# Patient Record
Sex: MALE | Race: WHITE | NOT HISPANIC OR LATINO | URBAN - METROPOLITAN AREA
[De-identification: names, ages, dates, MRNs, and addresses within clinical notes are randomized per-mention and may not be internally consistent; named-entity substitution may affect disease eponyms.]

---

## 2017-04-04 ENCOUNTER — OUTPATIENT (OUTPATIENT)
Dept: OUTPATIENT SERVICES | Facility: HOSPITAL | Age: 62
LOS: 1 days | Discharge: HOME | End: 2017-04-04

## 2017-06-27 DIAGNOSIS — K64.8 OTHER HEMORRHOIDS: ICD-10-CM

## 2017-06-27 DIAGNOSIS — I10 ESSENTIAL (PRIMARY) HYPERTENSION: ICD-10-CM

## 2017-06-27 DIAGNOSIS — Z12.11 ENCOUNTER FOR SCREENING FOR MALIGNANT NEOPLASM OF COLON: ICD-10-CM

## 2017-06-27 DIAGNOSIS — K64.4 RESIDUAL HEMORRHOIDAL SKIN TAGS: ICD-10-CM

## 2017-06-27 DIAGNOSIS — E78.00 PURE HYPERCHOLESTEROLEMIA, UNSPECIFIED: ICD-10-CM

## 2017-06-27 DIAGNOSIS — D12.2 BENIGN NEOPLASM OF ASCENDING COLON: ICD-10-CM

## 2017-06-27 DIAGNOSIS — R15.0 INCOMPLETE DEFECATION: ICD-10-CM

## 2017-06-27 DIAGNOSIS — F31.9 BIPOLAR DISORDER, UNSPECIFIED: ICD-10-CM

## 2018-07-12 PROBLEM — Z00.00 ENCOUNTER FOR PREVENTIVE HEALTH EXAMINATION: Status: ACTIVE | Noted: 2018-07-12

## 2018-07-16 ENCOUNTER — APPOINTMENT (OUTPATIENT)
Dept: SURGERY | Facility: CLINIC | Age: 63
End: 2018-07-16
Payer: MEDICARE

## 2018-07-16 VITALS
SYSTOLIC BLOOD PRESSURE: 138 MMHG | HEIGHT: 72 IN | BODY MASS INDEX: 32.91 KG/M2 | WEIGHT: 243 LBS | DIASTOLIC BLOOD PRESSURE: 82 MMHG

## 2018-07-16 DIAGNOSIS — Z82.49 FAMILY HISTORY OF ISCHEMIC HEART DISEASE AND OTHER DISEASES OF THE CIRCULATORY SYSTEM: ICD-10-CM

## 2018-07-16 DIAGNOSIS — K61.0 ANAL ABSCESS: ICD-10-CM

## 2018-07-16 DIAGNOSIS — F40.8 OTHER PHOBIC ANXIETY DISORDERS: ICD-10-CM

## 2018-07-16 DIAGNOSIS — F31.9 BIPOLAR DISORDER, UNSPECIFIED: ICD-10-CM

## 2018-07-16 DIAGNOSIS — F32.9 MAJOR DEPRESSIVE DISORDER, SINGLE EPISODE, UNSPECIFIED: ICD-10-CM

## 2018-07-16 DIAGNOSIS — L02.32 FURUNCLE OF BUTTOCK: ICD-10-CM

## 2018-07-16 DIAGNOSIS — I10 ESSENTIAL (PRIMARY) HYPERTENSION: ICD-10-CM

## 2018-07-16 PROCEDURE — 99202 OFFICE O/P NEW SF 15 MIN: CPT

## 2018-07-16 RX ORDER — METOPROLOL TARTRATE 50 MG/1
50 TABLET ORAL
Refills: 0 | Status: ACTIVE | COMMUNITY

## 2018-07-16 RX ORDER — ASPIRIN ENTERIC COATED TABLETS 81 MG 81 MG/1
81 TABLET, DELAYED RELEASE ORAL
Refills: 0 | Status: ACTIVE | COMMUNITY

## 2018-07-16 RX ORDER — DIVALPROEX SODIUM 500 MG/1
500 TABLET, DELAYED RELEASE ORAL
Refills: 0 | Status: ACTIVE | COMMUNITY

## 2018-07-16 RX ORDER — OXYBUTYNIN CHLORIDE 15 MG/1
15 TABLET, EXTENDED RELEASE ORAL
Refills: 0 | Status: ACTIVE | COMMUNITY

## 2018-07-16 RX ORDER — FLUOXETINE HYDROCHLORIDE 20 MG/1
20 CAPSULE ORAL
Refills: 0 | Status: ACTIVE | COMMUNITY

## 2018-07-16 RX ORDER — RAMIPRIL 2.5 MG/1
2.5 CAPSULE ORAL
Refills: 0 | Status: ACTIVE | COMMUNITY

## 2018-07-16 RX ORDER — RISPERIDONE 4 MG/1
4 TABLET ORAL
Refills: 0 | Status: ACTIVE | COMMUNITY

## 2018-08-06 ENCOUNTER — APPOINTMENT (OUTPATIENT)
Dept: SURGERY | Facility: CLINIC | Age: 63
End: 2018-08-06

## 2019-06-13 ENCOUNTER — APPOINTMENT (OUTPATIENT)
Dept: CARDIOLOGY | Facility: CLINIC | Age: 64
End: 2019-06-13
Payer: MEDICARE

## 2019-06-13 PROCEDURE — 99214 OFFICE O/P EST MOD 30 MIN: CPT

## 2019-06-13 PROCEDURE — 93000 ELECTROCARDIOGRAM COMPLETE: CPT

## 2019-11-26 ENCOUNTER — APPOINTMENT (OUTPATIENT)
Dept: CARDIOLOGY | Facility: CLINIC | Age: 64
End: 2019-11-26
Payer: MEDICARE

## 2019-11-26 PROCEDURE — 93306 TTE W/DOPPLER COMPLETE: CPT

## 2019-12-17 ENCOUNTER — APPOINTMENT (OUTPATIENT)
Dept: CARDIOLOGY | Facility: CLINIC | Age: 64
End: 2019-12-17
Payer: MEDICARE

## 2019-12-17 PROCEDURE — 93000 ELECTROCARDIOGRAM COMPLETE: CPT

## 2019-12-17 PROCEDURE — 99214 OFFICE O/P EST MOD 30 MIN: CPT

## 2020-01-01 ENCOUNTER — RX RENEWAL (OUTPATIENT)
Age: 65
End: 2020-01-01

## 2020-01-01 ENCOUNTER — INPATIENT (INPATIENT)
Facility: HOSPITAL | Age: 65
LOS: 0 days | End: 2020-12-29
Attending: SURGERY | Admitting: SURGERY
Payer: MEDICARE

## 2020-01-01 ENCOUNTER — RESULT REVIEW (OUTPATIENT)
Age: 65
End: 2020-01-01

## 2020-01-01 ENCOUNTER — APPOINTMENT (OUTPATIENT)
Dept: CARDIOLOGY | Facility: CLINIC | Age: 65
End: 2020-01-01

## 2020-01-01 ENCOUNTER — APPOINTMENT (OUTPATIENT)
Dept: CARDIOLOGY | Facility: CLINIC | Age: 65
End: 2020-01-01
Payer: MEDICARE

## 2020-01-01 VITALS
OXYGEN SATURATION: 93 % | SYSTOLIC BLOOD PRESSURE: 210 MMHG | WEIGHT: 235.01 LBS | RESPIRATION RATE: 18 BRPM | HEART RATE: 61 BPM | DIASTOLIC BLOOD PRESSURE: 109 MMHG

## 2020-01-01 VITALS — RESPIRATION RATE: 20 BRPM

## 2020-01-01 DIAGNOSIS — K55.019 ACUTE (REVERSIBLE) ISCHEMIA OF SMALL INTESTINE, EXTENT UNSPECIFIED: ICD-10-CM

## 2020-01-01 DIAGNOSIS — E78.5 HYPERLIPIDEMIA, UNSPECIFIED: ICD-10-CM

## 2020-01-01 DIAGNOSIS — K55.049 ACUTE INFARCTION OF LARGE INTESTINE, EXTENT UNSPECIFIED: ICD-10-CM

## 2020-01-01 DIAGNOSIS — J18.9 PNEUMONIA, UNSPECIFIED ORGANISM: ICD-10-CM

## 2020-01-01 DIAGNOSIS — I48.91 UNSPECIFIED ATRIAL FIBRILLATION: ICD-10-CM
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
## 2020-01-01 DIAGNOSIS — N17.9 ACUTE KIDNEY FAILURE, UNSPECIFIED: ICD-10-CM

## 2020-01-01 DIAGNOSIS — I10 ESSENTIAL (PRIMARY) HYPERTENSION: ICD-10-CM

## 2020-01-01 DIAGNOSIS — J96.01 ACUTE RESPIRATORY FAILURE WITH HYPOXIA: ICD-10-CM

## 2020-01-01 DIAGNOSIS — R65.21 SEVERE SEPSIS WITH SEPTIC SHOCK: ICD-10-CM

## 2020-01-01 DIAGNOSIS — Z95.1 PRESENCE OF AORTOCORONARY BYPASS GRAFT: ICD-10-CM

## 2020-01-01 DIAGNOSIS — E87.5 HYPERKALEMIA: ICD-10-CM

## 2020-01-01 DIAGNOSIS — E87.2 ACIDOSIS: ICD-10-CM

## 2020-01-01 DIAGNOSIS — I97.121 POSTPROCEDURAL CARDIAC ARREST FOLLOWING OTHER SURGERY: ICD-10-CM

## 2020-01-01 DIAGNOSIS — K63.89 OTHER SPECIFIED DISEASES OF INTESTINE: ICD-10-CM

## 2020-01-01 DIAGNOSIS — Z66 DO NOT RESUSCITATE: ICD-10-CM

## 2020-01-01 DIAGNOSIS — A41.9 SEPSIS, UNSPECIFIED ORGANISM: ICD-10-CM
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
## 2020-01-01 DIAGNOSIS — K56.2 VOLVULUS: ICD-10-CM

## 2020-01-01 LAB
ALBUMIN SERPL ELPH-MCNC: 2.3 G/DL — LOW (ref 3.5–5.2)
ALBUMIN SERPL ELPH-MCNC: 3.5 G/DL — SIGNIFICANT CHANGE UP (ref 3.5–5.2)
ALP SERPL-CCNC: 55 U/L — SIGNIFICANT CHANGE UP (ref 30–115)
ALP SERPL-CCNC: 64 U/L — SIGNIFICANT CHANGE UP (ref 30–115)
ALT FLD-CCNC: 153 U/L — HIGH (ref 0–41)
ALT FLD-CCNC: 35 U/L — SIGNIFICANT CHANGE UP (ref 0–41)
ANION GAP SERPL CALC-SCNC: 27 MMOL/L — HIGH (ref 7–14)
ANION GAP SERPL CALC-SCNC: 32 MMOL/L — HIGH (ref 7–14)
APTT BLD: 57.4 SEC — HIGH (ref 27–39.2)
AST SERPL-CCNC: 106 U/L — HIGH (ref 0–41)
AST SERPL-CCNC: 249 U/L — HIGH (ref 0–41)
BASE EXCESS BLDA CALC-SCNC: -23.6 MMOL/L — LOW (ref -2–2)
BASE EXCESS BLDV CALC-SCNC: -18 MMOL/L — LOW (ref -2–2)
BASE EXCESS BLDV CALC-SCNC: -20.7 MMOL/L — LOW (ref -2–2)
BASOPHILS # BLD AUTO: 0 K/UL — SIGNIFICANT CHANGE UP (ref 0–0.2)
BASOPHILS NFR BLD AUTO: 0 % — SIGNIFICANT CHANGE UP (ref 0–1)
BILIRUB SERPL-MCNC: 0.5 MG/DL — SIGNIFICANT CHANGE UP (ref 0.2–1.2)
BILIRUB SERPL-MCNC: 0.6 MG/DL — SIGNIFICANT CHANGE UP (ref 0.2–1.2)
BLD GP AB SCN SERPL QL: SIGNIFICANT CHANGE UP
BUN SERPL-MCNC: 87 MG/DL — CRITICAL HIGH (ref 10–20)
BUN SERPL-MCNC: 88 MG/DL — CRITICAL HIGH (ref 10–20)
BURR CELLS BLD QL SMEAR: PRESENT — SIGNIFICANT CHANGE UP
CA-I SERPL-SCNC: 1 MMOL/L — LOW (ref 1.12–1.3)
CA-I SERPL-SCNC: 1.01 MMOL/L — LOW (ref 1.12–1.3)
CALCIUM SERPL-MCNC: 7.3 MG/DL — LOW (ref 8.5–10.1)
CALCIUM SERPL-MCNC: 8.2 MG/DL — LOW (ref 8.5–10.1)
CHLORIDE SERPL-SCNC: 91 MMOL/L — LOW (ref 98–110)
CHLORIDE SERPL-SCNC: 96 MMOL/L — LOW (ref 98–110)
CO2 BLDV-SCNC: 53 — SIGNIFICANT CHANGE UP
CO2 SERPL-SCNC: 11 MMOL/L — LOW (ref 17–32)
CO2 SERPL-SCNC: 14 MMOL/L — LOW (ref 17–32)
CREAT SERPL-MCNC: 3.9 MG/DL — HIGH (ref 0.7–1.5)
CREAT SERPL-MCNC: 4.3 MG/DL — CRITICAL HIGH (ref 0.7–1.5)
CRP SERPL-MCNC: 62.42 MG/DL — HIGH (ref 0–0.4)
D DIMER BLD IA.RAPID-MCNC: 2003 NG/ML DDU — HIGH (ref 0–230)
EOSINOPHIL # BLD AUTO: 0 K/UL — SIGNIFICANT CHANGE UP (ref 0–0.7)
EOSINOPHIL NFR BLD AUTO: 0 % — SIGNIFICANT CHANGE UP (ref 0–8)
FERRITIN SERPL-MCNC: 1008 NG/ML — HIGH (ref 30–400)
GAS PNL BLDA: SIGNIFICANT CHANGE UP
GAS PNL BLDA: SIGNIFICANT CHANGE UP
GAS PNL BLDV: 133 MMOL/L — LOW (ref 136–145)
GAS PNL BLDV: 136 MMOL/L — SIGNIFICANT CHANGE UP (ref 136–145)
GAS PNL BLDV: SIGNIFICANT CHANGE UP
GAS PNL BLDV: SIGNIFICANT CHANGE UP
GIANT PLATELETS BLD QL SMEAR: PRESENT — SIGNIFICANT CHANGE UP
GLUCOSE SERPL-MCNC: 135 MG/DL — HIGH (ref 70–99)
GLUCOSE SERPL-MCNC: 154 MG/DL — HIGH (ref 70–99)
HCO3 BLDA-SCNC: 13 MMOL/L — LOW (ref 23–27)
HCO3 BLDV-SCNC: 12 MMOL/L — LOW (ref 22–29)
HCO3 BLDV-SCNC: 13 MMOL/L — LOW (ref 22–29)
HCT VFR BLD CALC: 41.6 % — LOW (ref 42–52)
HCT VFR BLD CALC: 49.5 % — SIGNIFICANT CHANGE UP (ref 42–52)
HCT VFR BLDA CALC: 51.3 % — HIGH (ref 34–44)
HCT VFR BLDA CALC: 62.3 % — HIGH (ref 34–44)
HGB BLD CALC-MCNC: 16.7 G/DL — SIGNIFICANT CHANGE UP (ref 14–18)
HGB BLD CALC-MCNC: 20.3 G/DL — CRITICAL HIGH (ref 14–18)
HGB BLD-MCNC: 12.8 G/DL — LOW (ref 14–18)
HGB BLD-MCNC: 16.4 G/DL — SIGNIFICANT CHANGE UP (ref 14–18)
INR BLD: 1.55 RATIO — HIGH (ref 0.65–1.3)
LACTATE BLDV-MCNC: 11.2 MMOL/L — HIGH (ref 0.5–1.6)
LACTATE BLDV-MCNC: 11.3 MMOL/L — HIGH (ref 0.5–1.6)
LDH SERPL L TO P-CCNC: 597 U/L — HIGH (ref 50–242)
LIDOCAIN IGE QN: 20 U/L — SIGNIFICANT CHANGE UP (ref 7–60)
LYMPHOCYTES # BLD AUTO: 19.1 % — LOW (ref 20.5–51.1)
LYMPHOCYTES # BLD AUTO: 2.38 K/UL — SIGNIFICANT CHANGE UP (ref 1.2–3.4)
MACROCYTES BLD QL: SLIGHT — SIGNIFICANT CHANGE UP
MANUAL SMEAR VERIFICATION: SIGNIFICANT CHANGE UP
MCHC RBC-ENTMCNC: 30.1 PG — SIGNIFICANT CHANGE UP (ref 27–31)
MCHC RBC-ENTMCNC: 30.3 PG — SIGNIFICANT CHANGE UP (ref 27–31)
MCHC RBC-ENTMCNC: 30.8 G/DL — LOW (ref 32–37)
MCHC RBC-ENTMCNC: 33.1 G/DL — SIGNIFICANT CHANGE UP (ref 32–37)
MCV RBC AUTO: 91.5 FL — SIGNIFICANT CHANGE UP (ref 80–94)
MCV RBC AUTO: 97.9 FL — HIGH (ref 80–94)
METAMYELOCYTES # FLD: 3.5 % — HIGH (ref 0–0)
MONOCYTES # BLD AUTO: 4.12 K/UL — HIGH (ref 0.1–0.6)
MONOCYTES NFR BLD AUTO: 33 % — HIGH (ref 1.7–9.3)
NEUTROPHILS # BLD AUTO: 4.99 K/UL — SIGNIFICANT CHANGE UP (ref 1.4–6.5)
NEUTROPHILS NFR BLD AUTO: 31.3 % — LOW (ref 42.2–75.2)
NEUTS BAND # BLD: 8.7 % — HIGH (ref 0–6)
NRBC # BLD: 1 /100 — HIGH (ref 0–0)
NRBC # BLD: 2 /100 WBCS — HIGH (ref 0–0)
NRBC # BLD: SIGNIFICANT CHANGE UP /100 WBCS (ref 0–0)
NT-PROBNP SERPL-SCNC: HIGH PG/ML (ref 0–300)
PCO2 BLDA: 86 MMHG — CRITICAL HIGH (ref 38–42)
PCO2 BLDV: 53 MMHG — HIGH (ref 41–51)
PCO2 BLDV: 57 MMHG — HIGH (ref 41–51)
PH BLDA: 6.78 — CRITICAL LOW (ref 7.38–7.42)
PH BLDV: 6.95 — LOW (ref 7.26–7.43)
PH BLDV: 7.01 — LOW (ref 7.26–7.43)
PLAT MORPH BLD: NORMAL — SIGNIFICANT CHANGE UP
PLATELET # BLD AUTO: 139 K/UL — SIGNIFICANT CHANGE UP (ref 130–400)
PLATELET # BLD AUTO: 172 K/UL — SIGNIFICANT CHANGE UP (ref 130–400)
PO2 BLDA: 56 MMHG — LOW (ref 78–95)
PO2 BLDV: 36 MMHG — SIGNIFICANT CHANGE UP (ref 20–40)
PO2 BLDV: 40 MMHG — SIGNIFICANT CHANGE UP (ref 20–40)
POIKILOCYTOSIS BLD QL AUTO: SIGNIFICANT CHANGE UP
POLYCHROMASIA BLD QL SMEAR: SLIGHT — SIGNIFICANT CHANGE UP
POTASSIUM BLDV-SCNC: 4.2 MMOL/L — SIGNIFICANT CHANGE UP (ref 3.3–5.6)
POTASSIUM BLDV-SCNC: 4.8 MMOL/L — SIGNIFICANT CHANGE UP (ref 3.3–5.6)
POTASSIUM SERPL-MCNC: 4.2 MMOL/L — SIGNIFICANT CHANGE UP (ref 3.5–5)
POTASSIUM SERPL-MCNC: 6.4 MMOL/L — CRITICAL HIGH (ref 3.5–5)
POTASSIUM SERPL-SCNC: 4.2 MMOL/L — SIGNIFICANT CHANGE UP (ref 3.5–5)
POTASSIUM SERPL-SCNC: 6.4 MMOL/L — CRITICAL HIGH (ref 3.5–5)
PROCALCITONIN SERPL-MCNC: >100 NG/ML — HIGH (ref 0.02–0.1)
PROT SERPL-MCNC: 3.9 G/DL — LOW (ref 6–8)
PROT SERPL-MCNC: 5.7 G/DL — LOW (ref 6–8)
PROTHROM AB SERPL-ACNC: 17.8 SEC — HIGH (ref 9.95–12.87)
RAPID RVP RESULT: SIGNIFICANT CHANGE UP
RBC # BLD: 4.25 M/UL — LOW (ref 4.7–6.1)
RBC # BLD: 5.41 M/UL — SIGNIFICANT CHANGE UP (ref 4.7–6.1)
RBC # FLD: 14.7 % — HIGH (ref 11.5–14.5)
RBC # FLD: 14.8 % — HIGH (ref 11.5–14.5)
RBC BLD AUTO: ABNORMAL
SAO2 % BLDA: 64 % — CRITICAL LOW (ref 94–98)
SAO2 % BLDV: 47 % — SIGNIFICANT CHANGE UP
SAO2 % BLDV: 51 % — SIGNIFICANT CHANGE UP
SARS-COV-2 RNA SPEC QL NAA+PROBE: SIGNIFICANT CHANGE UP
SMUDGE CELLS # BLD: PRESENT — SIGNIFICANT CHANGE UP
SODIUM SERPL-SCNC: 134 MMOL/L — LOW (ref 135–146)
SODIUM SERPL-SCNC: 137 MMOL/L — SIGNIFICANT CHANGE UP (ref 135–146)
TROPONIN T SERPL-MCNC: 1.31 NG/ML — CRITICAL HIGH
VARIANT LYMPHS # BLD: 4.4 % — SIGNIFICANT CHANGE UP (ref 0–5)
WBC # BLD: 12.48 K/UL — HIGH (ref 4.8–10.8)
WBC # BLD: 8.93 K/UL — SIGNIFICANT CHANGE UP (ref 4.8–10.8)
WBC # FLD AUTO: 12.48 K/UL — HIGH (ref 4.8–10.8)
WBC # FLD AUTO: 8.93 K/UL — SIGNIFICANT CHANGE UP (ref 4.8–10.8)

## 2020-01-01 PROCEDURE — 99292 CRITICAL CARE ADDL 30 MIN: CPT | Mod: 25

## 2020-01-01 PROCEDURE — 88307 TISSUE EXAM BY PATHOLOGIST: CPT | Mod: 26

## 2020-01-01 PROCEDURE — 71045 X-RAY EXAM CHEST 1 VIEW: CPT | Mod: 26,76

## 2020-01-01 PROCEDURE — 99223 1ST HOSP IP/OBS HIGH 75: CPT | Mod: 57

## 2020-01-01 PROCEDURE — 76937 US GUIDE VASCULAR ACCESS: CPT | Mod: 26

## 2020-01-01 PROCEDURE — 44143 PARTIAL REMOVAL OF COLON: CPT

## 2020-01-01 PROCEDURE — 74177 CT ABD & PELVIS W/CONTRAST: CPT | Mod: 26

## 2020-01-01 PROCEDURE — 31500 INSERT EMERGENCY AIRWAY: CPT

## 2020-01-01 PROCEDURE — 99441: CPT

## 2020-01-01 PROCEDURE — 36556 INSERT NON-TUNNEL CV CATH: CPT

## 2020-01-01 PROCEDURE — 71275 CT ANGIOGRAPHY CHEST: CPT | Mod: 26

## 2020-01-01 PROCEDURE — 97605 NEG PRS WND THER DME<=50SQCM: CPT

## 2020-01-01 PROCEDURE — 93010 ELECTROCARDIOGRAM REPORT: CPT

## 2020-01-01 PROCEDURE — 99291 CRITICAL CARE FIRST HOUR: CPT | Mod: 25

## 2020-01-01 PROCEDURE — 93880 EXTRACRANIAL BILAT STUDY: CPT

## 2020-01-01 RX ORDER — FLUOXETINE HCL 10 MG
0 CAPSULE ORAL
Qty: 0 | Refills: 0 | DISCHARGE

## 2020-01-01 RX ORDER — DIVALPROEX SODIUM 500 MG/1
1 TABLET, DELAYED RELEASE ORAL
Qty: 0 | Refills: 0 | DISCHARGE

## 2020-01-01 RX ORDER — ROSUVASTATIN CALCIUM 20 MG/1
20 TABLET, FILM COATED ORAL
Qty: 90 | Refills: 3 | Status: ACTIVE | COMMUNITY
Start: 2020-01-01 | End: 1900-01-01

## 2020-01-01 RX ORDER — METRONIDAZOLE 500 MG
500 TABLET ORAL ONCE
Refills: 0 | Status: COMPLETED | OUTPATIENT
Start: 2020-01-01 | End: 2020-01-01

## 2020-01-01 RX ORDER — PIPERACILLIN AND TAZOBACTAM 4; .5 G/20ML; G/20ML
3.38 INJECTION, POWDER, LYOPHILIZED, FOR SOLUTION INTRAVENOUS ONCE
Refills: 0 | Status: DISCONTINUED | OUTPATIENT
Start: 2020-01-01 | End: 2020-01-01

## 2020-01-01 RX ORDER — SODIUM CHLORIDE 9 MG/ML
3000 INJECTION, SOLUTION INTRAVENOUS ONCE
Refills: 0 | Status: COMPLETED | OUTPATIENT
Start: 2020-01-01 | End: 2020-01-01

## 2020-01-01 RX ORDER — PANTOPRAZOLE SODIUM 20 MG/1
8 TABLET, DELAYED RELEASE ORAL
Qty: 80 | Refills: 0 | Status: DISCONTINUED | OUTPATIENT
Start: 2020-01-01 | End: 2020-01-01

## 2020-01-01 RX ORDER — CHLORHEXIDINE GLUCONATE 213 G/1000ML
15 SOLUTION TOPICAL EVERY 12 HOURS
Refills: 0 | Status: DISCONTINUED | OUTPATIENT
Start: 2020-01-01 | End: 2020-01-01

## 2020-01-01 RX ORDER — CEFEPIME 1 G/1
2000 INJECTION, POWDER, FOR SOLUTION INTRAMUSCULAR; INTRAVENOUS ONCE
Refills: 0 | Status: COMPLETED | OUTPATIENT
Start: 2020-01-01 | End: 2020-01-01

## 2020-01-01 RX ORDER — CALCIUM CHLORIDE
1000 POWDER (GRAM) MISCELLANEOUS ONCE
Refills: 0 | Status: COMPLETED | OUTPATIENT
Start: 2020-01-01 | End: 2020-01-01

## 2020-01-01 RX ORDER — DILTIAZEM HCL 120 MG
25 CAPSULE, EXT RELEASE 24 HR ORAL
Qty: 125 | Refills: 0 | Status: DISCONTINUED | OUTPATIENT
Start: 2020-01-01 | End: 2020-01-01

## 2020-01-01 RX ORDER — PHENYLEPHRINE HYDROCHLORIDE 10 MG/ML
10 INJECTION INTRAVENOUS ONCE
Refills: 0 | Status: DISCONTINUED | OUTPATIENT
Start: 2020-01-01 | End: 2020-01-01

## 2020-01-01 RX ORDER — RAMIPRIL 10 MG/1
10 CAPSULE ORAL
Qty: 90 | Refills: 3 | Status: ACTIVE | COMMUNITY
Start: 2020-01-01 | End: 1900-01-01

## 2020-01-01 RX ORDER — GLUCAGON INJECTION, SOLUTION 0.5 MG/.1ML
1 INJECTION, SOLUTION SUBCUTANEOUS ONCE
Refills: 0 | Status: COMPLETED | OUTPATIENT
Start: 2020-01-01 | End: 2020-01-01

## 2020-01-01 RX ORDER — PANTOPRAZOLE SODIUM 20 MG/1
40 TABLET, DELAYED RELEASE ORAL ONCE
Refills: 0 | Status: COMPLETED | OUTPATIENT
Start: 2020-01-01 | End: 2020-01-01

## 2020-01-01 RX ORDER — SODIUM BICARBONATE 1 MEQ/ML
0.11 SYRINGE (ML) INTRAVENOUS
Qty: 150 | Refills: 0 | Status: DISCONTINUED | OUTPATIENT
Start: 2020-01-01 | End: 2020-01-01

## 2020-01-01 RX ORDER — RISPERIDONE 4 MG/1
0 TABLET ORAL
Qty: 0 | Refills: 0 | DISCHARGE

## 2020-01-01 RX ORDER — ROSUVASTATIN CALCIUM 20 MG/1
20 TABLET, FILM COATED ORAL DAILY
Qty: 90 | Refills: 3 | Status: ACTIVE | COMMUNITY
Start: 1900-01-01 | End: 1900-01-01

## 2020-01-01 RX ORDER — SODIUM CHLORIDE 9 MG/ML
2000 INJECTION, SOLUTION INTRAVENOUS ONCE
Refills: 0 | Status: COMPLETED | OUTPATIENT
Start: 2020-01-01 | End: 2020-01-01

## 2020-01-01 RX ORDER — DILTIAZEM HCL 120 MG
10 CAPSULE, EXT RELEASE 24 HR ORAL ONCE
Refills: 0 | Status: COMPLETED | OUTPATIENT
Start: 2020-01-01 | End: 2020-01-01

## 2020-01-01 RX ORDER — DEXTROSE 50 % IN WATER 50 %
50 SYRINGE (ML) INTRAVENOUS ONCE
Refills: 0 | Status: COMPLETED | OUTPATIENT
Start: 2020-01-01 | End: 2020-01-01

## 2020-01-01 RX ORDER — PHENYLEPHRINE HYDROCHLORIDE 10 MG/ML
0.3 INJECTION INTRAVENOUS
Qty: 160 | Refills: 0 | Status: DISCONTINUED | OUTPATIENT
Start: 2020-01-01 | End: 2020-01-01

## 2020-01-01 RX ORDER — SODIUM CHLORIDE 9 MG/ML
1000 INJECTION INTRAMUSCULAR; INTRAVENOUS; SUBCUTANEOUS ONCE
Refills: 0 | Status: COMPLETED | OUTPATIENT
Start: 2020-01-01 | End: 2020-01-01

## 2020-01-01 RX ORDER — INSULIN HUMAN 100 [IU]/ML
10 INJECTION, SOLUTION SUBCUTANEOUS ONCE
Refills: 0 | Status: COMPLETED | OUTPATIENT
Start: 2020-01-01 | End: 2020-01-01

## 2020-01-01 RX ORDER — NOREPINEPHRINE BITARTRATE/D5W 8 MG/250ML
0.05 PLASTIC BAG, INJECTION (ML) INTRAVENOUS
Qty: 8 | Refills: 0 | Status: DISCONTINUED | OUTPATIENT
Start: 2020-01-01 | End: 2020-01-01

## 2020-01-01 RX ORDER — CALCIUM CHLORIDE
1 POWDER (GRAM) MISCELLANEOUS ONCE
Refills: 0 | Status: DISCONTINUED | OUTPATIENT
Start: 2020-01-01 | End: 2020-01-01

## 2020-01-01 RX ORDER — METOPROLOL TARTRATE 25 MG/1
25 TABLET, FILM COATED ORAL
Qty: 180 | Refills: 3 | Status: ACTIVE | COMMUNITY
Start: 2020-01-01 | End: 1900-01-01

## 2020-01-01 RX ORDER — FUROSEMIDE 40 MG
20 TABLET ORAL ONCE
Refills: 0 | Status: COMPLETED | OUTPATIENT
Start: 2020-01-01 | End: 2020-01-01

## 2020-01-01 RX ORDER — VANCOMYCIN HCL 1 G
1500 VIAL (EA) INTRAVENOUS ONCE
Refills: 0 | Status: COMPLETED | OUTPATIENT
Start: 2020-01-01 | End: 2020-01-01

## 2020-01-01 RX ADMIN — Medication 1000 MILLIGRAM(S): at 22:41

## 2020-01-01 RX ADMIN — Medication 10 MILLIGRAM(S): at 18:18

## 2020-01-01 RX ADMIN — PANTOPRAZOLE SODIUM 10 MG/HR: 20 TABLET, DELAYED RELEASE ORAL at 21:08

## 2020-01-01 RX ADMIN — Medication 75 MEQ/KG/HR: at 22:55

## 2020-01-01 RX ADMIN — CEFEPIME 2000 MILLIGRAM(S): 1 INJECTION, POWDER, FOR SOLUTION INTRAMUSCULAR; INTRAVENOUS at 21:17

## 2020-01-01 RX ADMIN — Medication 100 MILLIGRAM(S): at 19:22

## 2020-01-01 RX ADMIN — Medication 500 MILLIGRAM(S): at 21:17

## 2020-01-01 RX ADMIN — SODIUM CHLORIDE 2000 MILLILITER(S): 9 INJECTION, SOLUTION INTRAVENOUS at 21:17

## 2020-01-01 RX ADMIN — Medication 9.99 MICROGRAM(S)/KG/MIN: at 21:08

## 2020-01-01 RX ADMIN — SODIUM CHLORIDE 1000 MILLILITER(S): 9 INJECTION INTRAMUSCULAR; INTRAVENOUS; SUBCUTANEOUS at 21:17

## 2020-01-01 RX ADMIN — Medication 50 MILLILITER(S): at 22:23

## 2020-01-01 RX ADMIN — SODIUM CHLORIDE 1000 MILLILITER(S): 9 INJECTION INTRAMUSCULAR; INTRAVENOUS; SUBCUTANEOUS at 18:19

## 2020-01-01 RX ADMIN — INSULIN HUMAN 10 UNIT(S): 100 INJECTION, SOLUTION SUBCUTANEOUS at 22:23

## 2020-01-01 RX ADMIN — SODIUM CHLORIDE 2000 MILLILITER(S): 9 INJECTION, SOLUTION INTRAVENOUS at 19:11

## 2020-01-01 RX ADMIN — Medication 300 MILLIGRAM(S): at 01:04

## 2020-01-01 RX ADMIN — Medication 20 MILLIGRAM(S): at 22:29

## 2020-01-01 RX ADMIN — CEFEPIME 100 MILLIGRAM(S): 1 INJECTION, POWDER, FOR SOLUTION INTRAMUSCULAR; INTRAVENOUS at 20:37

## 2020-01-01 RX ADMIN — PHENYLEPHRINE HYDROCHLORIDE 6 MICROGRAM(S)/KG/MIN: 10 INJECTION INTRAVENOUS at 21:52

## 2020-01-01 RX ADMIN — PANTOPRAZOLE SODIUM 40 MILLIGRAM(S): 20 TABLET, DELAYED RELEASE ORAL at 21:08

## 2020-01-01 RX ADMIN — Medication 25 MG/HR: at 18:18

## 2020-01-01 RX ADMIN — SODIUM CHLORIDE 3000 MILLILITER(S): 9 INJECTION, SOLUTION INTRAVENOUS at 21:14

## 2020-01-01 RX ADMIN — GLUCAGON INJECTION, SOLUTION 1 MILLIGRAM(S): 0.5 INJECTION, SOLUTION SUBCUTANEOUS at 21:08

## 2020-08-14 PROBLEM — E78.5 HYPERLIPIDEMIA: Status: ACTIVE | Noted: 2020-01-01

## 2020-12-28 NOTE — ED ADULT NURSE NOTE - NSIMPLEMENTINTERV_GEN_ALL_ED
Implemented All Fall with Harm Risk Interventions:  [place] to call system. Call bell, personal items and telephone within reach. Instruct patient to call for assistance. Room bathroom lighting operational. Non-slip footwear when patient is off stretcher. Physically safe environment: no spills, clutter or unnecessary equipment. Stretcher in lowest position, wheels locked, appropriate side rails in place. Provide visual cue, wrist band, yellow gown, etc. Monitor gait and stability. Monitor for mental status changes and reorient to person, place, and time. Review medications for side effects contributing to fall risk. Reinforce activity limits and safety measures with patient and family. Provide visual clues: red socks.

## 2020-12-28 NOTE — ED PROVIDER NOTE - PROGRESS NOTE DETAILS
TA: pt in afib w/ rvr; starting on cardizem drip. no b-lines on US. Dr. Payne - patient improving with Rx and care in ED. Additional HPI taken via the wife remote over the phone, no cough or fever but noted abdominal pain 12/26 worsening over 2 days with diarrhea and new dyspnea today. No history of diverticulitis (GI is [place]), but cardiovascular disease (Cardio is [place], PMD [place]).  Noted lab abnormalities, will repeat ABG and expedite imaging. Limited FAST POCUS reveals free fluid in splenorenal recess. Considering presentation and lab results, clinical findings concern for ischemic colitis vs. systemic COVID infection.  Tachypnea likely compensatory for metabolic process. Aggressive care with broad spectrum ABX, surgical consult, repeat labs, additional IVF. Dr. Payne - patient decompensated. Increasing lethargy noted. Limited responsiveness. Pulse and BP dropped, patient was emergently intubated. Coffee grounds emesis noted in oropharynx. ETT placed successfully with video laryngoscopy under RSI. Push doses of phenylephrine 10 mcg x 3 given, BP 50/30 and intubation was paralysis only for hypotension and unresponsive state during airway management.  Surgery made aware of situation. Family made aware of situation with limited prognosis based on findings.  Still pending repeat ABG patient found to be severely acidotic, given bicarb drip. Surgery spoke with family, pending daughter arrival to discuss OR decision.

## 2020-12-28 NOTE — ED PROVIDER NOTE - OBJECTIVE STATEMENT
The patient is 65 year old male with a history of CABG presenting for shortness of breath. Per wife, patient has been c/o diarrhea, diffuse abdominal pain and weakness for the past few days. States today, he began having worsening shortness of breath. Upon arrival, patient was tachycardic, tachypneic.

## 2020-12-28 NOTE — ED PROCEDURE NOTE - CPROC ED TIME OUT STATEMENT1
“Patient's name, , procedure and correct site were confirmed during the [place] Timeout.”
“Patient's name, , procedure and correct site were confirmed during the [place] Timeout.”
“Patient's name, , procedure and correct site were confirmed during the [place] Timeout.”

## 2020-12-28 NOTE — ED PROCEDURE NOTE - CPROC ED INDICATIONS1
GI bleed
emergency venous access/volume resuscitation
airway protection/critical patient/mental status change

## 2020-12-28 NOTE — ED PROVIDER NOTE - CARE PLAN
Principal Discharge DX:	Ischemic colitis  Secondary Diagnosis:	Acute pulmonary edema  Secondary Diagnosis:	Ventilatory failure  Secondary Diagnosis:	Atrial fibrillation, unspecified type  Secondary Diagnosis:	Pneumatosis intestinalis  Secondary Diagnosis:	Ventilatory failure

## 2020-12-28 NOTE — ED ADULT NURSE NOTE - CHIEF COMPLAINT QUOTE
Patient BIBA with sob and diarrhea x 3 days . RA 87% on scene. Patient on NRB 15L with 02 sat of 94%.

## 2020-12-28 NOTE — ED PROVIDER NOTE - PHYSICAL EXAMINATION
CONSTITUTIONAL: appears uncomfortable, tachypneic.   SKIN: warm, dry  HEAD: Normocephalic; atraumatic.  EYES: normal sclera and conjunctiva   ENT: No nasal discharge; airway clear.  NECK: Supple; non tender.  CARD: S1, S2 normal; no murmurs, gallops, or rubs. tachycardic. afib.   RESP: b/l crackles.   ABD: soft distended, nontender.   EXT: Normal ROM.  b/l 2+pitting edema.   LYMPH: No acute cervical adenopathy.  NEURO: Alert, oriented, grossly unremarkable  PSYCH: Cooperative, appropriate.

## 2020-12-28 NOTE — ED PROVIDER NOTE - FAMILY DETAILS FREE TEXT FOR MDM ADDL HISTORY OBTAINED FROM QUESTION
Family contacted to discuss findings, plan and other instructions. Questions answered. Family (wife Yajaira) bedside and present for discussion and prognosis.

## 2020-12-28 NOTE — ED PROVIDER NOTE - CLINICAL SUMMARY MEDICAL DECISION MAKING FREE TEXT BOX
65 male here for abdominal pain with diarrhea and worsening constitutional state until today. Presented to ED ill appearing and had aggressive management in the ED . Was hemodynamically unstable with parenteral Rx and IVF given for resolution with investigatory labs consults and imaging ordered. Pt decompensated in ED prior to imaging, was emergently intubated in ED and remains comatose on multiple vasopressors. Had CVL, arterial line, OGT/ETT placed in ED for aggressive resuscitation.  Presently critically ill with toxic lab results and imaging revealing catastrophic abdominal findings. Plan is continued resuscitation for possible operative management. Dr. Oakley saw patient bedside and d/w family regarding plan.

## 2020-12-28 NOTE — ED PROVIDER NOTE - SECONDARY DIAGNOSIS.
Acute pulmonary edema Ventilatory failure Atrial fibrillation, unspecified type Pneumatosis intestinalis

## 2020-12-28 NOTE — ED PROCEDURE NOTE - ATTENDING CONTRIBUTION TO CARE
I was present for and supervised the key critical aspects of the procedures performed during the care of the patient.     I personally evaluated the patient. I reviewed the Resident’s or Physician Assistant’s note (as assigned above), and agree with the findings and plan except as documented in my note.

## 2020-12-28 NOTE — CHART NOTE - NSCHARTNOTEFT_GEN_A_CORE
Chief Resident Note  Pt seen and examined  Pt is in still in extremist  Max levo and dillon  s/p another 3 l bolus  Lactic acid getting worse, ph is more acidotic  Abd more distended  Pt still hypotensive despite fluids resuscitation and pressors   Poor Prognosis   Will discuss with attending if pt is surgical candidate  Family aware of poor prognosis

## 2020-12-28 NOTE — ED PROCEDURE NOTE - CPROC ED TRACHE INTUB DETAIL1
Patient was pre-oxygenated. An endotracheal tube (ETT) was placed through the vocal cords into the trachea.  ETT position was confirmed by auscultation of bilateral breath sounds to all lung fields. ETCO2 level was appropriate./Difficult/crash intubation (see additional details section)./Patient connected to ventilator with settings as ordered.

## 2020-12-29 NOTE — H&P ADULT - NSHPLABSRESULTS_GEN_ALL_CORE
LABS:                       12.8   8.93  )-----------( 139      ( 28 Dec 2020 21:19 )             41.6     12-28    134<L>  |  96<L>  |  87<HH>  ----------------------------<  135<H>  6.4<HH>   |  11<L>  |  4.3<HH>    Ca    7.3<L>      28 Dec 2020 21:19    TPro  3.9<L>  /  Alb  2.3<L>  /  TBili  0.5  /  DBili  x   /  AST  249<H>  /  ALT  153<H>  /  AlkPhos  64  12-28    PT/INR - ( 28 Dec 2020 21:19 )   PT: 17.80 sec;   INR: 1.55 ratio    PTT - ( 28 Dec 2020 21:19 )  PTT:57.4 sec    Serum Pro-Brain Natriuretic Peptide: 95327 pg/mL (12-28 @ 17:27)      RADIOLOGY, EKG & ADDITIONAL TESTS: Reviewed.   < from: CT Abdomen and Pelvis w/ IV Cont (12.28.20 @ 21:03) >    IMPRESSION:    1. Findings most consistent with ischemic colitis of sigmoid colon with extensive portal venous gas:    Diffuse sigmoid colitis. Extensive portal venous gas, with intrahepatic portal venous air, gas within SMV, numerous mesenteric vessels, and serpiginous vessels coursing throughout proximal small bowel, gastric and esophageal wall; no definite pneumatosis or pneumoperitoneum identified. Evaluation of the RONAN limited on portal venous phase; proximal IMAappears patent.    2. Bilateral lung airspace consolidation, worst at left lower lobe and lingula, superimposed upon emphysema. Findings consistent with bilateral pneumonia in the appropriate clinical setting. Follow-up imaging to resolution recommended.    3. Additional lung ground glass opacities which may be attributed to pulmonary edema in appropriate clinical setting. Reflux of contrast into IVC; correlate for right heart dysfunction.    4. Mild gaseous distention of large bowel proximal to inflamed sigmoid colon, without evidence of mechanical obstruction. Additional distention of small bowel, jejunum up to 8 cm, without evidence of obstruction.    5. Infrarenal abdominal aortic aneurysm, 6.4 cm long, with diameter measuring up to 4.8 x4.5 cm.    6. No evidence of acute pulmonary embolism.    < end of copied text >

## 2020-12-29 NOTE — BRIEF OPERATIVE NOTE - NSICDXBRIEFPROCEDURE_GEN_ALL_CORE_FT
PROCEDURES:  Restriction of sigmoid colon 29-Dec-2020 03:00:56  Haile Gomes  Laparotomy, exploratory, emergent 29-Dec-2020 03:00:39  Haile Gomes

## 2020-12-29 NOTE — PRE-ANESTHESIA EVALUATION ADULT - NSANTHPMHFT_GEN_ALL_CORE
65M with likely ischemic colitis, patient is acutely decompensated in extremis, intubated, maxed on multiple pressors with a pH of

## 2020-12-29 NOTE — H&P ADULT - HISTORY OF PRESENT ILLNESS
65M with PMH of CABG presented to the ED with 2-3 days of vague abdominal pain, diarrhea, and more recently tachycardia and tachypnea. He was found to be in Afib with RVR and RR was in the 30s. He was originally suspected to have Covid PNA but the patient rapidly decompensated and required intubation. A line, and central line were placed and he underwent CTA of the chest and abdomen which showed a likely aspiration event with bilateral PNA as well as portal venous gas, pneumotosis of the stomach and bowel and air in the mesentery. At this point the patient was maxed out on 2 pressors and was still hypotensive with MAPs in the 30s and 40s. His poor prognosis was discussed extensively with his family and the ACS radha calculator was used which showed an almost 90% chance of mortality with surgical intervention. This was discussed and reviewed with the family bedside and they decided to pursue all management and want him to undergo surgery.

## 2020-12-29 NOTE — H&P ADULT - ATTENDING COMMENTS
This is 66 y/o male who came to ED complaining of abdominal pain and diarrhea for the past 2 days.  Patient was in respiratory failure and was intubated in the ED.  He underwent CTA chest and abdomen and was found with bilateral PNA as well as ischemic colitis and portal venous gas.  Surgery consult was called after the CT was performed.    I was called around 9 pm on 12/28/2020 regarding this patient who was already intubated and severely hypotensive with SBP in the 50s while on IVF and Levophed.    PE:  Unconscious  Chest: decreased breath sounds in  bilateral lung bases  CV : irrr; hypotensive  Abdomen: distended and tympanic.    CT Abd./Pelvis: consistent with portal venous gas and ischemic sigmoid colitis; distended small and large bowel.    LA 12  Creat. 4.2    ASSESSMENT:  66 y/o male with Ischemic Bowel. Possible Ischemic Sigmoid Colitis.  Portal venous gas.  Acute hypoxic respiratory failure.  Shock.  Acute kidney injury.  Lactic acidosis.  Hyperkalemia.    PLAN:  Patient was severely hypotensive and unstable with SBP in the 50s when I examined him and required resuscitation.  After 6 L of IVF and placed on HCO3 drip and two iv pressors, his SBP is in the 80s.  I discussed with patient's wife (next of kin) and two daughters his very critical condition and the fact that he is in multiorgan failure currently.  I explained to them that he has ischemic bowel which is most likely the reason for it.  I told them that his chance of dying during OR or during the immediate postoperative period exceeds 90%.  They understand but still want me to proceed with surgery.    Informed consent was obtained from the patient's wife (next of kin) after explaining all the above for exploratory laparotomy, possible bowel resection, possible ostomy. I also explained her that he may require open abdomen and multiple trips to OR if he survives.  All risks and benefits of the procedure were explained including but not limited to infection, bleeding, death and etc. She understood and agreed. This is 66 y/o male who came to ED complaining of abdominal pain and diarrhea for the past 2 days.  Patient was in respiratory failure and was intubated in the ED.  He underwent CTA chest and abdomen and was found with bilateral PNA as well as ischemic colitis and portal venous gas.  Surgery consult was called after the CT was performed.    I was called around 9 pm on 12/28/2020 regarding this patient who was already intubated and severely hypotensive with SBP in the 50s while on IVF and Levophed.    PE:  Unconscious  Chest: decreased breath sounds in  bilateral lung bases  CV : irrr; hypotensive  Abdomen: distended and tympanic.    CT Abd./Pelvis: consistent with portal venous gas and ischemic sigmoid colitis; distended small and large bowel.    LA 12  Creat. 4.2    ASSESSMENT:  66 y/o male with Ischemic Bowel. Possible Ischemic Sigmoid Colitis.  Portal venous gas.  Acute hypoxic respiratory failure.  Shock.  Acute kidney injury.  Lactic acidosis.  Hyperkalemia.    PLAN:  Patient was severely hypotensive and unstable with SBP in the 50s when I examined him and required resuscitation.  After 6 L of IVF and placed on HCO3 drip and two iv pressors, his SBP is in the 80s.  I discussed with patient's wife (next of kin) and two daughters his very critical condition and the fact that he is in multiorgan failure currently.  I explained to them that he has ischemic bowel which is most likely the reason for it.  I told them that his chance of dying during OR or during the immediate postoperative period exceeds 90%.  They understand but still want me to proceed with surgery.    Informed consent was obtained from the patient's wife (next of kin) after explaining all the above for exploratory laparotomy, possible bowel resection, possible ostomy. I also explained her that he may require open abdomen and multiple trips to OR if he survives.  All risks and benefits of the procedure were explained including but not limited to infection, bleeding, death and etc. She understood and agreed.    This note reflects my exam and care of this patient on 12/28/2020.

## 2020-12-29 NOTE — DISCHARGE NOTE FOR THE EXPIRED PATIENT - HOSPITAL COURSE
Patient is a 65M with PMH of CABG presented to the ED with 2-3 days of vague abdominal pain, diarrhea, and more recently tachycardia and tachypnea. He was found to be in Afib with RVR and RR was in the 30s. He was originally suspected to have Covid PNA but the patient rapidly decompensated and required intubation. A line, and central line were placed and he underwent CTA of the chest and abdomen which showed a likely aspiration event with bilateral PNA as well as portal venous gas, pneumotosis of the stomach and bowel and air in the mesentery. At this point the patient was maxed out on 2 pressors and was still hypotensive with MAPs in the 30s and 40s. His poor prognosis was discussed extensively with his family and the ACS radha calculator was used which showed an almost 90% chance of mortality with surgical intervention. This was discussed and reviewed with the family bedside and they decided to pursue all management and want him to undergo surgery.  At 02:46am on 2020 the patient was taken to the operating room where an exploratory laparotomy was performed as the patient then  at 02:55am, time of death called by attending physician Dr. Julio.  Patient is a 65M with PMH of CABG presented to the ED with 2-3 days of vague abdominal pain, diarrhea, and more recently tachycardia and tachypnea. He was found to be in Afib with RVR and RR was in the 30s. He was originally suspected to have Covid PNA but the patient rapidly decompensated and required intubation. A line, and central line were placed and he underwent CTA of the chest and abdomen which showed a likely aspiration event with bilateral PNA as well as portal venous gas, pneumotosis of the stomach and bowel and air in the mesentery. At this point the patient was maxed out on 2 pressors and was still hypotensive with MAPs in the 30s and 40s. His poor prognosis was discussed extensively with his family and the ACS radha calculator was used which showed an almost 90% chance of mortality with surgical intervention. This was discussed and reviewed with the family bedside and they decided to pursue all management and want him to undergo surgery.  At 02:46am on 2020 the patient was taken to the operating room where an exploratory laparotomy was performed as the patient then  at 02:55am, time of death called by attending physician Dr. Julio.     Case was presented to the ME- not accepting the case.  Patient is a 65M with PMH of CABG presented to the ED with 2 days of vague abdominal pain, diarrhea, and more recently tachycardia and tachypnea. He was found to be in Afib with RVR and RR was in the 30s. He was originally suspected to have Covid PNA but the patient rapidly decompensated and required intubation. A line, and central line were placed and he underwent CTA of the chest and abdomen which showed bilateral PNA as well as portal venous gas, gas in the mesenteric vessels as well as ischemic sigmoid colitis.  At this point the patient was maxed out on 2 pressors and was still hypotensive with SBP in the 80s after 6 L of IVF. His poor prognosis was discussed extensively with his family and the ACS radha calculator was used which showed an almost 90% chance of mortality with surgical intervention. This was discussed and reviewed with the family bedside and they decided to pursue operative management and want him to undergo surgery.  At 01:46am on 2020 the patient was taken to the operating room where an exploratory laparotomy was performed and sigmoid colon resection was done. Patient also had patchy ischemia of the entire SB and stomach.  Abthera Vac dressing placed. Entire procedure took about 1 hour. Postoperatively patient had cardiac arrest twice with return of VS after CPR performed. In PACU patient had another cardiac arrest and CPR started. Discussion with the family was done and his wife decided for DNR. The patient then  at 02:55am, time of death called by attending physician Dr. Julio.     Case was presented to the ME- not accepting the case.

## 2020-12-29 NOTE — H&P ADULT - NSHPPHYSICALEXAM_GEN_ALL_CORE
PHYSICAL EXAM:  GENERAL: not sedated with limited neurological status   HEENT: Normocephalic, NGT in place  CHEST/LUNG: Bilateral breath sounds  HEART: Regular rate and rhythm  ABDOMEN: tympanic, grossly distended,
Unknown if ever smoked

## 2020-12-29 NOTE — PRE-ANESTHESIA EVALUATION ADULT - NSANTHADDINFOFT_GEN_ALL_CORE
Discussed decision to proceed with procedure/ anesthesia with Patient's wife Yajaira Solis. Mrs. Solis understands patient's poor prognosis and high probability of death both with and without surgery. Mrs. Solis would still like to proceed with procedure.

## 2020-12-29 NOTE — PRE-ANESTHESIA EVALUATION ADULT - NSPROPOSEDPROCEDFT_GEN_ALL_CORE
Patient presents with chest pain since this AM. Was seen at urgent care and had elevated D-dimer.    exlaparotomy

## 2020-12-29 NOTE — CHART NOTE - NSCHARTNOTEFT_GEN_A_CORE
Chief Resident  Upon completion of case in operating room pt lost pulse. Multiple rounds of CPR were initiated with ROSC.  Pt brought to PACU.  Family spoken to about critical nature and poor prognosis.  Family made pt DNR  PT lost pulse again  Time of Death 2:55am, 12/29/2020

## 2020-12-29 NOTE — BRIEF OPERATIVE NOTE - OPERATION/FINDINGS
Distended abdomen with free fluid, Diffuse dilatation of small bowel with patchy necrosis, Patchy necrosis of stomach, sigmoid colon in RUQ above the live with patchy  areas of necrosis, can not rule out volvulus  of sigmoid colon. Sigmoid colon resection. Ends dropped. Abthera dressing applied Distended abdomen with free fluid, Diffuse dilatation of small bowel with patchy ischemia, Patchy ischemia of stomach. Sigmoid colon in RUQ above the live, distended and with transmural necrosis, can not rule out volvulus  of sigmoid colon. Sigmoid colon resection. Ends dropped. Abthera dressing applied.

## 2020-12-29 NOTE — H&P ADULT - ASSESSMENT
65M with likely ischemic colitis, patient is acutely decompensated in extremis, intubated, maxed on multiple pressors with a pH of <7. Family discussion was held and risks and benefits, and his poor prognosis was extensively discussed. The patient has ~90% of death with surgery according to the ACS risk calculator which was shown to the family. They understand the risks and have asked to proceed with surgery as the patient will also likely  shortly without intervention. Patient is being booked as level 1 to the OR     Plan:   OR as emergent level 1 case  Continue pressor support  Continue Abx  Continue IVF  NPO  ICU consult

## 2021-01-03 LAB
CULTURE RESULTS: SIGNIFICANT CHANGE UP
CULTURE RESULTS: SIGNIFICANT CHANGE UP
SPECIMEN SOURCE: SIGNIFICANT CHANGE UP
SPECIMEN SOURCE: SIGNIFICANT CHANGE UP

## 2021-01-05 LAB — SURGICAL PATHOLOGY STUDY: SIGNIFICANT CHANGE UP

## 2021-01-07 ENCOUNTER — APPOINTMENT (OUTPATIENT)
Dept: CARDIOLOGY | Facility: CLINIC | Age: 66
End: 2021-01-07

## 2021-01-14 ENCOUNTER — APPOINTMENT (OUTPATIENT)
Dept: CARDIOLOGY | Facility: CLINIC | Age: 66
End: 2021-01-14

## 2022-09-12 NOTE — PRE-ANESTHESIA EVALUATION ADULT - NSANTHALCOHOLSD_GEN_ALL_CORE
SURGEON:  RODNEY MASTERS M.D.    DATE OF SURGERY:  9/12/22    SURGICAL PROCEDURE:  CATARACT REMOVAL WITH LENS IMPLANT    LATERALITY:  LEFT EYE    COMPLEXITY:  NORMAL     IMPLANT USED:  ZCBOO 20.00 DIOPTERS    COMPLICATIONS:  NONE    ESTIMATED BLOOD LOSS: <1 cc    PROCEDURE:  THE PATIENT WAS BROUGHT TO THE OPERATING ROOM AFTER THE SURGICAL EYE WAS IDENTIFIED AND MARKED IN THE PREOPERATIVE HOLDING AREA.  ONCE IN THE O.R. THE PATIENT AND SURGICAL SITE WERE AGAIN IDENTIFIED BY ALL O.R. PERSONNEL DURING A FULL TIME OUT.   THE AREA ABOUT THE SURGICAL EYE WAS PREPPED AND DRAPED IN THE USUAL STERILE FASHION USING SOLUTIONS OF BETADINE AND ALCOHOL.   TOPICAL ANESTHESIA WAS INDUCED USING TETRACAINE.  A STERILE LID SPECULUM WAS PLACED BENEATH THE LIDS OF THE SURGICAL EYE.  IT WAS REMOVED PRIOR TO THE END OF THE PROCEDURE.  A 1 ML SUBCONJUNCTIVAL INJECTION OF LIDOCAINE 2% WITH EPINEPHRINE WAS ADMINISTERED SUPERIORLY AND A 4 MM AREA OF CONJUNCTIVA WAS OPENED AT THE LIMBUS USING A HARSHAD SCISSORS.   WET FIELD ELECTROCAUTERY WAS USED TO ACHIEVE HEMOSTASIS.   A SCLERAL INCISION WAS CREATED 1MM POSTERIOR TO THE SURGICAL LIMBUS USING A CRESCENT BLADE.   DISSECTION WAS TAKEN ANTERIORLY INTO CLEAR CORNEA WITHOUT ENTERING THE ANTERIOR CHAMBER.   NASAL AND TEMPORAL PERIPHERAL CORNEAL PARACENTESIS PORTS WERE CREATED WITH A 15 DEGREE SUPER BLADE.  INTRAOCULAR PRESERVATIVE FREE LIDOCAINE WAS INJECTED INTO THE ANTERIOR CHAMBER FOLLOWED BY INTRAOCULAR EPINEPHRINE.  AMVISC PLUS WAS INJECTED INTO THE ANTERIOR CHAMBER AND THE SCLERAL TUNNEL WAS COMPLETED USING A 2.4 MM BENT KERATOME.   A CONTINUOUS TEAR CAPSULORRHEXIS WAS THEN CREATED USING A CYSTITOME.   THIS WAS COMPLETED USING UTRATA FORCEPS.   HYDRODISSECTION WAN THEN PERFORMED.   A DIVIDE AND CONQUOR TECHNIQUE WAS THEN USED WITH THE PHACOEMULSIFICATION HANDPIECE TO REMOVE THE NUCLEUS OF THE CATARACT.   REMAINING CORTICAL FIBERS WERE REMOVED USING AUTOMATED IRRIGATION AND ASPIRATION.  THE CAPSULAR BAG WAS INSPECTED AND FOUND TO BE INTACT.  IT WAS FILLED WITH HEALON.  A POSTERIOR CHAMBER LENS WAS PLACED INTO THE CAPSULAR BAG AND ROTATED INTO POSITION.   IT WAS FOUND TO SPONTANEOUSLY CENTER.    REMAINING HEALON WAS REMOVED THOROUGHLY USING AUTOMATED IRRIGATION AND ASPIRATION.   THE SURGICAL WOUNDS WERE HYDRATED AND A SINGLE 10-0 NYLON STITCH WAS PLACED AT THE MAIN ENTRY SITE.  ALL WOUNDS WERE TESTED AND FOUND TO BE WATER TIGHT.   TOBRADEX EYE DROPS WERE PLACED ON THE CORNEA AND THE LID SPECULUM WAS REMOVED.  ERYTHROMYCIN OINTMENT WAS PLACED BENEATH A STERILE PATCH AND SHIELD.   THE PATIENT LEFT THE OPERATING ROOM IN STABLE CONDITION HAVING TOLERATED THE PROCEDURE WELL.   No

## 2022-10-21 NOTE — ED ADULT NURSE NOTE - TEMPLATE LIST FOR HEAD TO TOE ASSESSMENT
----- Message from [address] [place] [address],[address] sent at 10/21/2022 10:43 AM EDT -----  Iron level and hemoglobin are low  Continue oral iron supplement  Refer to hematology for possible iron infusion  Referral ordered  Give pt number to schedule appt  Patient aware and number provided  General

## 2023-05-08 NOTE — CHART NOTE - NSCHARTNOTEFT_GEN_A_CORE
This is 66 y/o male who was admitted on 12/29/2020.  Patient was diagnosed in ED with Ischemic Bowel.  Patient has Sepsis present on admission. He also had Septic Shock present on admission which required aggressive iv fluid resuscitation and iv pressors.  Patient went from ED to OR for Exploratory Laparotomy and was found with Small bowel patchy ischemia and necrotic Sigmoid colon. No

## 2024-03-06 NOTE — ED ADULT TRIAGE NOTE - CHIEF COMPLAINT QUOTE
Pt with upper abdominal pain for 2-3 days. Pain is constantly at a 9/10. Additional symptoms include pain with swallowing and eating. States it feels like it is his esophagus that is inflamed.  He notes chest pain but this is intermittent and chronic. Due to severity of pain advised ER. Patient verbalized understanding and is agreeable with plan of care. Patient has no further questions at this time.        Reason for Disposition   SEVERE abdominal pain (e.g., excruciating)    Protocols used: Abdominal Pain - Upper-A-OH     Patient BIBA with sob and diarrhea x 3 days . RA 87% on scene. Patient on NRB 15L with 02 sat of 94%.

## 2024-04-17 NOTE — ED PROVIDER NOTE - MDM ORDERS SUBMITTED SELECTION
Transition of Care Plan:    RUR: 8%   Prior Level of Functioning: Independent   Disposition: Home with Family Support   Follow up appointments: PCP  DME needed: None   Transportation at discharge:   IM/IMM Medicare/ letter given:   Caregiver Contact:   Discharge Caregiver contacted prior to discharge?   Care Conference needed?   Barriers to discharge:      Labs/EKG/Imaging Studies/Medications